# Patient Record
Sex: MALE
[De-identification: names, ages, dates, MRNs, and addresses within clinical notes are randomized per-mention and may not be internally consistent; named-entity substitution may affect disease eponyms.]

---

## 2020-03-05 PROBLEM — Z00.00 ENCOUNTER FOR PREVENTIVE HEALTH EXAMINATION: Status: ACTIVE | Noted: 2020-03-05

## 2020-03-06 ENCOUNTER — APPOINTMENT (OUTPATIENT)
Dept: UROLOGY | Facility: CLINIC | Age: 65
End: 2020-03-06
Payer: COMMERCIAL

## 2020-03-06 DIAGNOSIS — N50.819 TESTICULAR PAIN, UNSPECIFIED: ICD-10-CM

## 2020-03-06 DIAGNOSIS — Z80.0 FAMILY HISTORY OF MALIGNANT NEOPLASM OF DIGESTIVE ORGANS: ICD-10-CM

## 2020-03-06 DIAGNOSIS — N13.9 OBSTRUCTIVE AND REFLUX UROPATHY, UNSPECIFIED: ICD-10-CM

## 2020-03-06 DIAGNOSIS — N46.01 ORGANIC AZOOSPERMIA: ICD-10-CM

## 2020-03-06 DIAGNOSIS — N50.0 ATROPHY OF TESTIS: ICD-10-CM

## 2020-03-06 DIAGNOSIS — Z80.52 FAMILY HISTORY OF MALIGNANT NEOPLASM OF BLADDER: ICD-10-CM

## 2020-03-06 DIAGNOSIS — Z80.41 FAMILY HISTORY OF MALIGNANT NEOPLASM OF OVARY: ICD-10-CM

## 2020-03-06 DIAGNOSIS — L40.50 ARTHROPATHIC PSORIASIS, UNSPECIFIED: ICD-10-CM

## 2020-03-06 DIAGNOSIS — R35.1 NOCTURIA: ICD-10-CM

## 2020-03-06 DIAGNOSIS — Z80.42 FAMILY HISTORY OF MALIGNANT NEOPLASM OF PROSTATE: ICD-10-CM

## 2020-03-06 DIAGNOSIS — Z78.9 OTHER SPECIFIED HEALTH STATUS: ICD-10-CM

## 2020-03-06 DIAGNOSIS — C49.A0 GASTROINTESTINAL STOMACL TUMOR,UNSPECIFIED SITE: ICD-10-CM

## 2020-03-06 DIAGNOSIS — D48.1 NEOPLASM OF UNCERTAIN BEHAVIOR OF CONNECTIVE AND OTHER SOFT TISSUE: ICD-10-CM

## 2020-03-06 DIAGNOSIS — I86.1 SCROTAL VARICES: ICD-10-CM

## 2020-03-06 LAB
BILIRUB UR QL STRIP: NORMAL
CLARITY UR: CLEAR
COLLECTION METHOD: NORMAL
GLUCOSE UR-MCNC: NORMAL
HCG UR QL: NORMAL EU/DL
HGB UR QL STRIP.AUTO: NORMAL
KETONES UR-MCNC: NORMAL
LEUKOCYTE ESTERASE UR QL STRIP: NORMAL
NITRITE UR QL STRIP: NORMAL
PH UR STRIP: 5.5
PROT UR STRIP-MCNC: NORMAL
SP GR UR STRIP: 1.02

## 2020-03-06 PROCEDURE — 81003 URINALYSIS AUTO W/O SCOPE: CPT | Mod: QW

## 2020-03-06 PROCEDURE — 99205 OFFICE O/P NEW HI 60 MIN: CPT

## 2020-03-06 PROCEDURE — 76857 US EXAM PELVIC LIMITED: CPT

## 2020-03-06 NOTE — REVIEW OF SYSTEMS
[see HPI] : see HPI [Joint Pain] : joint pain [Joint Swelling] : joint swelling [Negative] : Heme/Lymph [Skin Lesions] : skin lesion

## 2020-03-06 NOTE — PHYSICAL EXAM
[General Appearance - Well Developed] : well developed [General Appearance - Well Nourished] : well nourished [Normal Appearance] : normal appearance [Well Groomed] : well groomed [General Appearance - In No Acute Distress] : no acute distress [Edema] : no peripheral edema [Respiration, Rhythm And Depth] : normal respiratory rhythm and effort [Exaggerated Use Of Accessory Muscles For Inspiration] : no accessory muscle use [Abdomen Soft] : soft [Abdomen Tenderness] : non-tender [Abdomen Mass (___ Cm)] : no abdominal mass palpated [Abdomen Hernia] : no hernia was discovered [Costovertebral Angle Tenderness] : no ~M costovertebral angle tenderness [Urethral Meatus] : meatus normal [Penis Abnormality] : normal circumcised penis [Urinary Bladder Findings] : the bladder was normal on palpation [Scrotum] : the scrotum was normal [Epididymis] : the epididymides were normal [Testes Tenderness] : no tenderness of the testes [Testes Mass (___cm)] : there were no testicular masses [No Prostate Nodules] : no prostate nodules [Normal Station and Gait] : the gait and station were normal for the patient's age [Skin Color & Pigmentation] : normal skin color and pigmentation [] : no rash [No Focal Deficits] : no focal deficits [Oriented To Time, Place, And Person] : oriented to person, place, and time [Affect] : the affect was normal [Mood] : the mood was normal [Not Anxious] : not anxious [No Palpable Adenopathy] : no palpable adenopathy [FreeTextEntry1] : Extensive psoriatic lesions

## 2020-03-06 NOTE — HISTORY OF PRESENT ILLNESS
[FreeTextEntry1] : Mr. GIOVANA JOHNSON comes in today for a urologic evaluation.  He presents with a 2 year onset of bilateral testicular-groin pain (more often on the right side).  It is described as intermittent, localized, dull, more pronounced at night and possibly exacerbated by caffeine and ejaculation.  He denies trauma to the region. Currently the pain is localized to the right groin. 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 Johnson reports moderate LUTS (obstructive and irritative) and nocturia x 1-3. 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 \par \par His erections are normal. He is not sexually active ("virgin").\par \par Scrotal sono:  12/14/19--Slight testicular heterogeneity.  Mild left-sided varicocele; 4/10/19--Diffusely heterogeneous testicles.  Bilat varicoceles; 10/19/18--Heterogeneous testicles with bilat epididymal cysts. Bilat varicoceles. \par \par PSAs:  2/1/19--0.8\par Urine culture:  2/1/19--No growth\par Hormonal profile: 12/5/18- Markedly elevated FSH and LH, Normal  testosterone (438)

## 2020-03-06 NOTE — ASSESSMENT
[FreeTextEntry1] : I discussed the findings and options with . GIOVANA JOHNSON in detail. I am not sure as to why he is experiencing the intermittent chronic bilateral testicular pain as well as the testicular atrophy. Unfortunately there isn't anything that can be done to reverse the process. Regarding the orchalgia, my recommendation is that he consider a pain management consultation as the next step.\par \par Because of the desmoid tumors, exogenous testosterone supplementation is not advised. \par \par Mr. Johnson will have a PSA ordered with his next blood draw, as we were unable to access a vein. \par \par \par